# Patient Record
Sex: MALE | Employment: FULL TIME | URBAN - NONMETROPOLITAN AREA
[De-identification: names, ages, dates, MRNs, and addresses within clinical notes are randomized per-mention and may not be internally consistent; named-entity substitution may affect disease eponyms.]

---

## 2023-12-13 ENCOUNTER — MEDICAL CORRESPONDENCE (OUTPATIENT)
Dept: HEALTH INFORMATION MANAGEMENT | Facility: HOSPITAL | Age: 48
End: 2023-12-13

## 2023-12-14 ENCOUNTER — TRANSCRIBE ORDERS (OUTPATIENT)
Dept: OTHER | Age: 48
End: 2023-12-14

## 2023-12-14 DIAGNOSIS — M79.18 MYOFASCIAL PAIN: Primary | ICD-10-CM

## 2024-03-25 ENCOUNTER — VIRTUAL VISIT (OUTPATIENT)
Dept: PHYSICAL THERAPY | Facility: HOSPITAL | Age: 49
End: 2024-03-25
Payer: COMMERCIAL

## 2024-03-25 DIAGNOSIS — M26.609 TMJ (TEMPOROMANDIBULAR JOINT SYNDROME): Primary | ICD-10-CM

## 2024-03-25 PROCEDURE — 999N000104 HC STATISTIC NO CHARGE: Mod: GT,95

## 2024-03-25 PROCEDURE — 97161 PT EVAL LOW COMPLEX 20 MIN: CPT | Mod: GP,GT,95

## 2024-03-25 PROCEDURE — 97110 THERAPEUTIC EXERCISES: CPT | Mod: GP,GT,95

## 2024-03-25 PROCEDURE — 97530 THERAPEUTIC ACTIVITIES: CPT | Mod: GP,GT,95

## 2024-03-25 NOTE — PROGRESS NOTES
PHYSICAL THERAPY EVALUATION  Type of Visit: Evaluation    See electronic medical record for Abuse and Falls Screening details.    Subjective       Presenting condition or subjective complaint:  TMJ pain and dysfunction  Date of onset: 12/14/23    PAST MEDICAL HISTORY: No past medical history on file.  Pt offers that they have GI implant to decrease reflux (magnetic)    PAST SURGICAL HISTORY: No past surgical history on file.    FAMILY HISTORY: No family history on file.    SOCIAL HISTORY:   Social History     Tobacco Use    Smoking status: Not on file    Smokeless tobacco: Not on file   Substance Use Topics    Alcohol use: Not on file       Prior diagnostic imaging/testing results:       CT Maxilla Mandible TMJ without IV Contrast  Order: [id number]  Impression    A few small subchondral cysts along the superior surface of the left  condylar head. Otherwise normal exam.  Narrative    EXAM: CT MAXILLA MANDIBLE TMJ WITHOUT IV CONTRAST    COMPARISON: CT head 08/17/2019.    FINDINGS: Mild flattening of the condylar heads bilaterally. On the left there  are a few small subchondral cysts along the superior surface of the condylar  head. The temporomandibular joint spaces are not narrowed. The mandible is  otherwise normal in appearance.    Small retention cysts in bilateral maxillary sinuses. The paranasal sinuses and  mastoid air cells are otherwise clear. The middle ear cavities are clear. Imaged  portions of the intracranial contents are unremarkable.  Prior therapy history for the same diagnosis, illness or injury:    Routine dental care; Self management    Prior Level of Function  Transfers: Independent  Ambulation: Independent  ADL: Independent  IADL: Driving, Work, Yard work    Living Environment  Social support:   Patient denies difficulty navigating home environment and endorses a strong support system that is accessible in times of need.     Employment:    Sales  Hobbies/Interests:  Music and concerts, outdoors  with dog, shoots trap    Patient goals for therapy:  Reduced jaw pain and     Pain assessment: Pain present 2/10 (consistent)     Objective     Headache- Yes  Jaw Noises- Left sided clicking  Modified Diet- Yes  Ear symptoms- Yes, fullness and tinnitus  Parafunctal Habits- Unilateral chewing  Sleep- Improved in past several months with CPAP  Caffeine- Coffee in the morning 2 cups  Alcohol- No concerns  Exercise- Walking with dog  Stress- Low to moderate, good support system in times of need      OBJECTIVE   Observation: Patient presents to department in no acute distress.     Palpation:   Deep Masseter- Right -, Left +  Temporalis- Right -, Left ++  EO MP- Right -, Left -  SCM- Right -, Left +  Suboccipitals- Right -, Left -      Range of Motion:   TMJ Range of Motion   Pain Free Opening- 3 FT  Maximum Opening- 3 FT+  Left Laterotrusion- WNL  Right Laterotrusion- WNL  Opening Pattern- Rightward deviation  Posture: Forward head, protracted shoulders     Neurological Assessment:   UE Neurodynamics:  Median Nerve- NEG  Ulnar Nerve- NEG  Radial Nerve- NEG    Cervical Spine Range of Motion   Active Motion: WNL for assessment and treatment of TMD  Modified VAT: NEG  Right upper extremity range of motion: WNL   Left upper extremity range of motion: WNL           Assessment & Plan   CLINICAL IMPRESSIONS  Medical Diagnosis: TMJ Dysfunction    Treatment Diagnosis: TMJ Dysfunction   Impression/Assessment: Patient is a 48 year old male with TMJ pain and dysfunction complaints consistent with presentation of left disc displacement with reduction, left myalgia of masticatory muscles and potential TMD headaches.  The following significant findings have been identified: Pain, Impaired muscle performance, Decreased activity tolerance, and Impaired posture. These impairments interfere with their ability to perform self care tasks as compared to previous level of function.     Clinical Decision Making (Complexity):  Clinical  Presentation: Stable/Uncomplicated  Clinical Presentation Rationale: based on medical and personal factors listed in PT evaluation  Clinical Decision Making (Complexity): Low complexity    PLAN OF CARE  Treatment Interventions:  Modalities: Cryotherapy, Hot Pack  Interventions: Manual Therapy, Neuromuscular Re-education, Therapeutic Activity, Therapeutic Exercise, Self-Care/Home Management    Long Term Goals     PT Goal 1  Goal Identifier: STG 1  Goal Description: Patient will be independent with a short-term home exercise program.  Target Date: 04/22/24  PT Goal 2  Goal Identifier: STG 2  Goal Description: Patient will understand and demonstrate improved posture and techniques such that patient places less strain over cervical spine.  Target Date: 04/22/24  PT Goal 3  Goal Identifier: LTG 1  Goal Description: Improve score on utilized outcome measures to correlate with clinically significant change.  Target Date: 05/20/24  PT Goal 4  Goal Identifier: LTG 2  Goal Description: Patient will endorse confidence in ability to manage home exercise program independently to promote continued progression and management of jaw and neck pain symptoms following discharge from PT services.  Target Date: 05/20/24      Frequency of Treatment: 1x/week tapered to discharge  Duration of Treatment: 8 weeks    Education Assessment:   Learner/Method: Patient;Listening;Reading;Demonstration;Pictures/Video;No Barriers to Learning    Risks and benefits of evaluation/treatment have been explained.   Patient/Family/caregiver agrees with Plan of Care.     Evaluation Time:     PT Eval, Low Complexity Minutes (97621): 25       Signing Clinician: Karissa Saldivar, PT, DPT, NRP, Cert. DN    Patient referred for TMJ Dysfunction Physical Therapy by Dr. Funmi Regalado, DMD from the [facility].  PCP Signature requested for continuity of care and insurance purposes.      I  certify the need for these services furnished under this plan of treatment and while under my care. (Physician co-signature of this document indicates review and certification of the therapy plan).  Phone: [phone]  Fax: [phone]   Edgard Bolanos MD    _____________________________     __________________________    ____________  Physician's Signature                 Date               Time

## 2024-04-17 ENCOUNTER — VIRTUAL VISIT (OUTPATIENT)
Dept: PHYSICAL THERAPY | Facility: HOSPITAL | Age: 49
End: 2024-04-17
Payer: COMMERCIAL

## 2024-04-17 DIAGNOSIS — M26.609 TMJ (TEMPOROMANDIBULAR JOINT SYNDROME): Primary | ICD-10-CM

## 2024-04-17 PROCEDURE — 97110 THERAPEUTIC EXERCISES: CPT | Mod: GP,GT,95

## 2024-04-17 PROCEDURE — 97530 THERAPEUTIC ACTIVITIES: CPT | Mod: GP,GT,95

## 2024-05-19 ENCOUNTER — HEALTH MAINTENANCE LETTER (OUTPATIENT)
Age: 49
End: 2024-05-19

## 2025-06-08 ENCOUNTER — HEALTH MAINTENANCE LETTER (OUTPATIENT)
Age: 50
End: 2025-06-08